# Patient Record
Sex: MALE
[De-identification: names, ages, dates, MRNs, and addresses within clinical notes are randomized per-mention and may not be internally consistent; named-entity substitution may affect disease eponyms.]

---

## 2022-10-21 PROBLEM — Z00.00 ENCOUNTER FOR PREVENTIVE HEALTH EXAMINATION: Status: ACTIVE | Noted: 2022-10-21

## 2022-10-24 ENCOUNTER — APPOINTMENT (OUTPATIENT)
Dept: UROLOGY | Facility: CLINIC | Age: 32
End: 2022-10-24

## 2023-08-01 PROBLEM — Z00.00 ENCOUNTER FOR PREVENTIVE HEALTH EXAMINATION: Status: ACTIVE | Noted: 2023-08-01

## 2023-08-03 PROBLEM — M25.561 RIGHT KNEE PAIN: Status: ACTIVE | Noted: 2023-08-03

## 2023-08-03 NOTE — PHYSICAL EXAM
[de-identified] : Constitutional:  [    ], alert and oriented, cooperative, in no acute distress.  HEENT  NC/AT.  Appearance: symmetric  Neck/Back Straight without deformity or instability.  Good ROM.  Chest/Respiratory  Respiratory effort: no intercostal retractions or use of accessory muscles. Nonlabored Breathing  Skin  On inspection, warm and dry without rashes or lesions.  Mental Status:  Judgment, insight: intact Orientation: oriented to time, place, and person  Neurological: Sensory and Motor are grossly intact throughout  Right Knee  Inspection:     Skin intact, no rashes or lesions     No Effusion     Non-tender to palpation over tibial tubercle, patella, medial and lateral joint line, and pes insertion.  Range of Motion: 	Extension - [     ] degrees 	Flexion - [     ] degrees 	Alignment - [     ] degrees 	Extensor lag: None  Stability:      Demonstrates no Varus or Valgus instability      Negative Anterior or Posterior drawer.      Negative Lachman's      Negative McMurrays  Patella: stable, tracks well.   Neurologic Exam     Motor intact including 5/5 Extensor Hallucis Longus, 5/5 Flexor Hallucis Longus, 5/5 Tibialis Anterior and 5/5 Gastrocnemius     Sensation Intact to Light Touch including Saphenous, Sural, Superficial Peroneal, Deep Peroneal, Tibial nerve distributions  Vascular Exam     Foot is warm and well perfused with 2+ Dorsalis Pedis Pulse   No pain with range of motion of the bilateral hips or left knee. No lumbar paraspinal muscle tenderness.

## 2023-08-04 ENCOUNTER — APPOINTMENT (OUTPATIENT)
Dept: ORTHOPEDIC SURGERY | Facility: CLINIC | Age: 33
End: 2023-08-04

## 2023-08-04 DIAGNOSIS — M25.561 PAIN IN RIGHT KNEE: ICD-10-CM

## 2023-08-08 PROBLEM — Z00.00 ENCOUNTER FOR PREVENTIVE HEALTH EXAMINATION: Status: ACTIVE | Noted: 2023-08-08

## 2023-08-11 ENCOUNTER — APPOINTMENT (OUTPATIENT)
Dept: ORTHOPEDIC SURGERY | Facility: CLINIC | Age: 33
End: 2023-08-11
Payer: COMMERCIAL

## 2023-08-11 VITALS
WEIGHT: 160 LBS | BODY MASS INDEX: 25.11 KG/M2 | DIASTOLIC BLOOD PRESSURE: 68 MMHG | HEART RATE: 67 BPM | HEIGHT: 67 IN | SYSTOLIC BLOOD PRESSURE: 104 MMHG

## 2023-08-11 DIAGNOSIS — M25.561 PAIN IN RIGHT KNEE: ICD-10-CM

## 2023-08-11 PROCEDURE — 73564 X-RAY EXAM KNEE 4 OR MORE: CPT | Mod: RT

## 2023-08-11 PROCEDURE — 72170 X-RAY EXAM OF PELVIS: CPT

## 2023-08-11 PROCEDURE — 99203 OFFICE O/P NEW LOW 30 MIN: CPT

## 2023-08-16 NOTE — PHYSICAL EXAM
[de-identified] : Constitutional: 33 year old male, alert and oriented, cooperative, in no acute distress.  HEENT  NC/AT.  Appearance: symmetric  Neck/Back Straight without deformity or instability.  Good ROM.  Chest/Respiratory  Respiratory effort: no intercostal retractions or use of accessory muscles. Nonlabored Breathing  Skin  On inspection, warm and dry without rashes or lesions.  Mental Status:  Judgment, insight: intact Orientation: oriented to time, place, and person  Neurological: Sensory and Motor are grossly intact throughout  Right Knee  Inspection:     Skin intact, no rashes or lesions     No Effusion     Non-tender to palpation over tibial tubercle, patella, medial and lateral joint line, and pes insertion.  Range of Motion: 	Extension - 0 degrees 	Flexion - 120 degrees 	Extensor lag: None  Stability:      Demonstrates no Varus or Valgus instability      Negative Anterior or Posterior drawer.      Negative Lachman's      Negative McMurrays  Patella: stable, tracks well.   Neurologic Exam     Motor intact including 5/5 Extensor Hallucis Longus, 5/5 Flexor Hallucis Longus, 5/5 Tibialis Anterior and 5/5 Gastrocnemius     Sensation Intact to Light Touch including Saphenous, Sural, Superficial Peroneal, Deep Peroneal, Tibial nerve distributions  Vascular Exam     Foot is warm and well perfused with 2+ Dorsalis Pedis Pulse   No pain with range of motion of the bilateral hips or left knee. No lumbar paraspinal muscle tenderness.  [de-identified] : XRay:  XRays of the Pelvis (1 View) taken in the office today and discussed with the patient. XRays demonstrate no obvious fracture or dislocation. There is no significant evidence of osteoarthritis or osteophyte formation. (my personal interpretation).   XRay:  XRays of the Right Knee (4 Views) taken in the office today and discussed with the patient. XRays demonstrate no obvious fracture or dislocation. There is no significant evidence of osteoarthritis or osteophyte formation. There is lateral patellar tracking seen on the [place] view. (my personal interpretation).

## 2023-08-16 NOTE — HISTORY OF PRESENT ILLNESS
[de-identified] : Theresa Bahena is a 33-year-old male who presents to the office for evaluation of his right knee pain.  Patient's been experiencing intermittent right knee pain for about 2 years.  He first had knee pain about 2 years ago and then it improved.  He had the pain again last year.  Pain is located over the anterior knee.  Pain is worse with squatting, bending, and flexion of the knee.  He is not taking any pain medications at this time.  He has not tried physical therapy or injections.  He has no pain with walking.  No hip pain.  Patient does have some left lower back pain.  No radiation of the pain.  History: None

## 2023-08-16 NOTE — DISCUSSION/SUMMARY
[de-identified] : Theresa Bahena is a 33-year-old male who presents to the office for evaluation of his right knee pain.  Patient has been experiencing intermittent right knee pain for about 2 years.  Pain is located over the anterior knee is worse with flexion of the knee.  X-rays showed no obvious fractures or dislocations, but did show lateral patellar tracking seen on the sunrise view.  Examination showed good knee range of motion.  Discussed with patient the examination and imaging findings.  Discussed with patient that his right knee pain is possibly secondary to patellofemoral syndrome.  Discussed the management of his knee pain at this time, including physical therapy, bracing, and anti-inflammatories.  Patient was given referral to physical therapy.  He does not want a knee brace at this time.  He will take over-the-counter anti-inflammatories as needed for his pain control.  Patient will follow-up in 2 to 3 months for reevaluation and management.  Patient understanding and in agreement with the plan.  All questions answered.  Plan: -Physical therapy -Follow-up in 2 to 3 months for reevaluation and management

## 2023-08-24 ENCOUNTER — APPOINTMENT (OUTPATIENT)
Dept: UROLOGY | Facility: CLINIC | Age: 33
End: 2023-08-24
Payer: COMMERCIAL

## 2023-08-24 VITALS
TEMPERATURE: 98.1 F | WEIGHT: 166 LBS | HEART RATE: 74 BPM | HEIGHT: 67 IN | RESPIRATION RATE: 16 BRPM | OXYGEN SATURATION: 96 % | SYSTOLIC BLOOD PRESSURE: 115 MMHG | BODY MASS INDEX: 26.06 KG/M2 | DIASTOLIC BLOOD PRESSURE: 74 MMHG

## 2023-08-24 DIAGNOSIS — R39.15 URGENCY OF URINATION: ICD-10-CM

## 2023-08-24 DIAGNOSIS — Z78.9 OTHER SPECIFIED HEALTH STATUS: ICD-10-CM

## 2023-08-24 DIAGNOSIS — R35.0 FREQUENCY OF MICTURITION: ICD-10-CM

## 2023-08-24 LAB
BILIRUB UR QL STRIP: NEGATIVE
GLUCOSE UR-MCNC: NEGATIVE
HCG UR QL: 0.2 EU/DL
HGB UR QL STRIP.AUTO: NEGATIVE
KETONES UR-MCNC: NEGATIVE
LEUKOCYTE ESTERASE UR QL STRIP: NEGATIVE
NITRITE UR QL STRIP: NEGATIVE
PH UR STRIP: 7
PROT UR STRIP-MCNC: NEGATIVE
SP GR UR STRIP: 1.02

## 2023-08-24 PROCEDURE — 81003 URINALYSIS AUTO W/O SCOPE: CPT | Mod: QW

## 2023-08-24 PROCEDURE — 51798 US URINE CAPACITY MEASURE: CPT

## 2023-08-24 PROCEDURE — 99203 OFFICE O/P NEW LOW 30 MIN: CPT

## 2023-08-24 RX ORDER — TROSPIUM CHLORIDE 60 MG/1
60 CAPSULE, EXTENDED RELEASE ORAL
Qty: 30 | Refills: 5 | Status: ACTIVE | COMMUNITY
Start: 2023-08-24 | End: 1900-01-01

## 2023-08-24 NOTE — HISTORY OF PRESENT ILLNESS
[Urinary Urgency] : urinary urgency [FreeTextEntry1] : 33M presents for initial evaluation of urinary frequency   PMH significant for: nothing PSH significant for: nothing Significant meds: nothing   Patient reports years-long history of urinary urgency Reports that is has been going on for about 4-5 years but has gotten progressively worse over the last 2 years Reports feeling urge to urinate 30 to 40 mins after drinking fluids  Reports moderate level of distress  Associated with nothing Previously treated with nothing  Daytime Frequency: 6-10 Nighttime Frequency: 0-1 Pads per day: 0 Straining to void: no Intermittency: no Dribbling: no Subjective Incomplete Emptying: no UTIs this past year: 0 PVR 36cc  Daily Fluid Total: 2L Daily Caffeine Total: 0   Neurologic Hx, Vision or Balance changes: no

## 2023-08-24 NOTE — ASSESSMENT
[FreeTextEntry1] : Mr. Bahena presents for initial evaluation of urinary urgency and frequency (new diagnosis, uncertain prognosis) Reports years long history of symptoms which have been progressing over time Normal fluid intake, caffeine intake, and behavioral habits No other clinically relevant history UA: WNL, PVR: 36  Discussed the risks, benefits, alternatives, and possible side effects of anticholinergic therapy with the patient, including but not limited to dry eyes, dry mouth, constipation and mental status changes. We reviewed literature suggesting increased risk of developing dementia with long-term use and that patient would need to be regularly monitored on the medication.  Recommendations -Start trospium 60 mg daily -RTC 1 month

## 2023-08-24 NOTE — REVIEW OF SYSTEMS
[Negative] : Heme/Lymph [FreeTextEntry2] : Strange urge to urinate causing leak of urine, Frequent Urination

## 2023-08-24 NOTE — PHYSICAL EXAM
[General Appearance - Well Developed] : well developed [General Appearance - Well Nourished] : well nourished [Normal Appearance] : normal appearance [Well Groomed] : well groomed [] : no respiratory distress [Abdomen Soft] : soft [Normal Station and Gait] : the gait and station were normal for the patient's age [Affect] : the affect was normal [Mood] : the mood was normal

## 2023-09-29 ENCOUNTER — APPOINTMENT (OUTPATIENT)
Dept: UROLOGY | Facility: CLINIC | Age: 33
End: 2023-09-29

## 2023-10-20 ENCOUNTER — APPOINTMENT (OUTPATIENT)
Dept: ORTHOPEDIC SURGERY | Facility: CLINIC | Age: 33
End: 2023-10-20

## 2023-12-15 ENCOUNTER — APPOINTMENT (OUTPATIENT)
Dept: ORTHOPEDIC SURGERY | Facility: CLINIC | Age: 33
End: 2023-12-15
Payer: COMMERCIAL

## 2023-12-15 VITALS
WEIGHT: 173 LBS | HEART RATE: 77 BPM | BODY MASS INDEX: 27.15 KG/M2 | HEIGHT: 67 IN | DIASTOLIC BLOOD PRESSURE: 61 MMHG | SYSTOLIC BLOOD PRESSURE: 100 MMHG

## 2023-12-15 DIAGNOSIS — M22.2X1 PATELLOFEMORAL DISORDERS, RIGHT KNEE: ICD-10-CM

## 2023-12-15 PROCEDURE — 99213 OFFICE O/P EST LOW 20 MIN: CPT

## 2023-12-15 NOTE — PHYSICAL EXAM
[de-identified] : Constitutional: 33 year old male, alert and oriented, cooperative, in no acute distress.  HEENT  NC/AT.  Appearance: symmetric  Neck/Back Straight without deformity or instability.  Good ROM.  Chest/Respiratory  Respiratory effort: no intercostal retractions or use of accessory muscles. Nonlabored Breathing  Skin  On inspection, warm and dry without rashes or lesions.  Mental Status:  Judgment, insight: intact Orientation: oriented to time, place, and person  Neurological: Sensory and Motor are grossly intact throughout  Right Knee  Inspection:     Skin intact, no rashes or lesions     No Effusion     Non-tender to palpation over tibial tubercle, patella, medial and lateral joint line, and pes insertion.  Range of Motion: 	Extension - 0 degrees 	Flexion - 120 degrees 	Extensor lag: None  Stability:      Demonstrates no Varus or Valgus instability      Negative Anterior or Posterior drawer.      Negative Lachman's      Negative McMurrays  Patella: stable, tracks well.   Neurologic Exam     Motor intact including 5/5 Extensor Hallucis Longus, 5/5 Flexor Hallucis Longus, 5/5 Tibialis Anterior and 5/5 Gastrocnemius     Sensation Intact to Light Touch including Saphenous, Sural, Superficial Peroneal, Deep Peroneal, Tibial nerve distributions  Vascular Exam     Foot is warm and well perfused with 2+ Dorsalis Pedis Pulse   No pain with range of motion of the bilateral hips or left knee. No lumbar paraspinal muscle tenderness.  [de-identified] : XRay:  XRays of the Pelvis (1 View) taken on 8/11/2023. XRays demonstrate no obvious fracture or dislocation. There is no significant evidence of osteoarthritis or osteophyte formation. (my personal interpretation).   XRay:  XRays of the Right Knee (4 Views) taken on 8/11/2023. XRays demonstrate no obvious fracture or dislocation. There is no significant evidence of osteoarthritis or osteophyte formation. There is lateral patellar tracking seen on the [place] view. (my personal interpretation).

## 2023-12-15 NOTE — HISTORY OF PRESENT ILLNESS
[de-identified] : 12/15/2023  Theresa Bahena presents to the office for follow-up of his right knee pain.  Patient continues to have right knee pain.  Pain is located over the anterior knee.  He did not go to formal physical therapy, but did home exercises.  Patient was running and felt a recurrence of the pain.  He then stopped exercises.  Pain is worse with running and jumping.  8/11/2023 Theresa Bahena is a 33-year-old male who presents to the office for evaluation of his right knee pain.  Patient's been experiencing intermittent right knee pain for about 2 years.  He first had knee pain about 2 years ago and then it improved.  He had the pain again last year.  Pain is located over the anterior knee.  Pain is worse with squatting, bending, and flexion of the knee.  He is not taking any pain medications at this time.  He has not tried physical therapy or injections.  He has no pain with walking.  No hip pain.  Patient does have some left lower back pain.  No radiation of the pain.  History: None

## 2023-12-15 NOTE — DISCUSSION/SUMMARY
[de-identified] : Theresa Bahena is a 33-year-old male who presents to the office for follow-up of his right knee pain.  Patient has been experiencing intermittent right knee pain for about 2 years.  Pain is located over the anterior knee is worse with flexion of the knee.  Prior x-rays showed no obvious fractures or dislocations, but did show lateral patellar tracking seen on the sunrise view.  Examination showed good knee range of motion.  Discussed with patient the examination and imaging findings.  Discussed with patient that his right knee pain is possibly secondary to patellofemoral syndrome.  Discussed the management of his knee pain at this time, including physical therapy and anti-inflammatories.  Patient was previously given a referral to physical therapy.  He will take over-the-counter anti-inflammatories as needed for his pain control.  Patient would like a right knee MRI.  Right knee MRI was ordered.  Patient will follow-up in 3 months for reevaluation and management, pending results of the MRI.  Patient understanding and in agreement with the plan.  All questions answered.  Plan: -MRI Right Knee -Physical therapy -Follow-up in 3 months for reevaluation and management

## 2024-08-09 ENCOUNTER — APPOINTMENT (OUTPATIENT)
Dept: ORTHOPEDIC SURGERY | Facility: CLINIC | Age: 34
End: 2024-08-09

## 2024-08-09 PROCEDURE — 99213 OFFICE O/P EST LOW 20 MIN: CPT

## 2024-08-09 NOTE — DISCUSSION/SUMMARY
[de-identified] : Theresa Bahena is a 33-year-old male who presents to the office for follow-up of his right knee pain.  Patient has been experiencing intermittent right knee pain for about 2 years.  Pain is located over the anterior knee is worse with flexion of the knee.  Prior x-rays showed no obvious fractures or dislocations, but did show lateral patellar tracking seen on the sunrise view.  Examination showed good knee range of motion.  Discussed with patient the examination and imaging findings.  Discussed with patient that his right knee pain is possibly secondary to patellofemoral syndrome.  Discussed the management of his knee pain at this time, including physical therapy and anti-inflammatories.  Patient was previously given a referral to physical therapy.  He will take over-the-counter anti-inflammatories as needed for his pain control.  Patient would like a right knee MRI.  Right knee MRI was ordered.  Patient will follow-up in 3 months for reevaluation and management, pending results of the MRI.  Patient understanding and in agreement with the plan.  All questions answered.  Plan: -MRI Right Knee -Home Exercises -Follow-up in 1 month for reevaluation and management

## 2024-08-09 NOTE — HISTORY OF PRESENT ILLNESS
[de-identified] : 8/9/2024  Continues to have right knee pain Tried PT, with some relief, but continues to have knee pain No falls No fevers/chills    12/15/2023  Theresa Bahena presents to the office for follow-up of his right knee pain.  Patient continues to have right knee pain.  Pain is located over the anterior knee.  He did not go to formal physical therapy, but did home exercises.  Patient was running and felt a recurrence of the pain.  He then stopped exercises.  Pain is worse with running and jumping.  8/11/2023 Theresa Bahena is a 33-year-old male who presents to the office for evaluation of his right knee pain.  Patient's been experiencing intermittent right knee pain for about 2 years.  He first had knee pain about 2 years ago and then it improved.  He had the pain again last year.  Pain is located over the anterior knee.  Pain is worse with squatting, bending, and flexion of the knee.  He is not taking any pain medications at this time.  He has not tried physical therapy or injections.  He has no pain with walking.  No hip pain.  Patient does have some left lower back pain.  No radiation of the pain.  History: None

## 2024-08-09 NOTE — PHYSICAL EXAM
[de-identified] : Constitutional: 34 year old male, alert and oriented, cooperative, in no acute distress.  HEENT  NC/AT.  Appearance: symmetric  Neck/Back Straight without deformity or instability.  Good ROM.  Chest/Respiratory  Respiratory effort: no intercostal retractions or use of accessory muscles. Nonlabored Breathing  Skin  On inspection, warm and dry without rashes or lesions.  Mental Status:  Judgment, insight: intact Orientation: oriented to time, place, and person  Neurological: Sensory and Motor are grossly intact throughout  Right Knee  Inspection:     Skin intact, no rashes or lesions     No Effusion     Non-tender to palpation over tibial tubercle, patella, medial and lateral joint line, and pes insertion.  Range of Motion: 	Extension - 0 degrees 	Flexion - 120 degrees 	Extensor lag: None  Stability:      Demonstrates no Varus or Valgus instability      Negative Anterior or Posterior drawer.      Negative Lachman's      Negative McMurrays  Patella: stable, tracks well.   Neurologic Exam     Motor intact including 5/5 Extensor Hallucis Longus, 5/5 Flexor Hallucis Longus, 5/5 Tibialis Anterior and 5/5 Gastrocnemius     Sensation Intact to Light Touch including Saphenous, Sural, Superficial Peroneal, Deep Peroneal, Tibial nerve distributions  Vascular Exam     Foot is warm and well perfused with 2+ Dorsalis Pedis Pulse   No pain with range of motion of the bilateral hips or left knee. No lumbar paraspinal muscle tenderness.  [de-identified] : XRay:  XRays of the Pelvis (1 View) taken on 8/11/2023. XRays demonstrate no obvious fracture or dislocation. There is no significant evidence of osteoarthritis or osteophyte formation. (my personal interpretation).   XRay:  XRays of the Right Knee (4 Views) taken on 8/11/2023. XRays demonstrate no obvious fracture or dislocation. There is no significant evidence of osteoarthritis or osteophyte formation. There is lateral patellar tracking seen on the [place] view. (my personal interpretation).

## 2024-08-09 NOTE — PHYSICAL EXAM
[de-identified] : Constitutional: 34 year old male, alert and oriented, cooperative, in no acute distress.  HEENT  NC/AT.  Appearance: symmetric  Neck/Back Straight without deformity or instability.  Good ROM.  Chest/Respiratory  Respiratory effort: no intercostal retractions or use of accessory muscles. Nonlabored Breathing  Skin  On inspection, warm and dry without rashes or lesions.  Mental Status:  Judgment, insight: intact Orientation: oriented to time, place, and person  Neurological: Sensory and Motor are grossly intact throughout  Right Knee  Inspection:     Skin intact, no rashes or lesions     No Effusion     Non-tender to palpation over tibial tubercle, patella, medial and lateral joint line, and pes insertion.  Range of Motion: 	Extension - 0 degrees 	Flexion - 120 degrees 	Extensor lag: None  Stability:      Demonstrates no Varus or Valgus instability      Negative Anterior or Posterior drawer.      Negative Lachman's      Negative McMurrays  Patella: stable, tracks well.   Neurologic Exam     Motor intact including 5/5 Extensor Hallucis Longus, 5/5 Flexor Hallucis Longus, 5/5 Tibialis Anterior and 5/5 Gastrocnemius     Sensation Intact to Light Touch including Saphenous, Sural, Superficial Peroneal, Deep Peroneal, Tibial nerve distributions  Vascular Exam     Foot is warm and well perfused with 2+ Dorsalis Pedis Pulse   No pain with range of motion of the bilateral hips or left knee. No lumbar paraspinal muscle tenderness.  [de-identified] : XRay:  XRays of the Pelvis (1 View) taken on 8/11/2023. XRays demonstrate no obvious fracture or dislocation. There is no significant evidence of osteoarthritis or osteophyte formation. (my personal interpretation).   XRay:  XRays of the Right Knee (4 Views) taken on 8/11/2023. XRays demonstrate no obvious fracture or dislocation. There is no significant evidence of osteoarthritis or osteophyte formation. There is lateral patellar tracking seen on the [place] view. (my personal interpretation).

## 2024-08-09 NOTE — HISTORY OF PRESENT ILLNESS
[de-identified] : 8/9/2024  Continues to have right knee pain Tried PT, with some relief, but continues to have knee pain No falls No fevers/chills    12/15/2023  Theresa Bahena presents to the office for follow-up of his right knee pain.  Patient continues to have right knee pain.  Pain is located over the anterior knee.  He did not go to formal physical therapy, but did home exercises.  Patient was running and felt a recurrence of the pain.  He then stopped exercises.  Pain is worse with running and jumping.  8/11/2023 Theresa Bahena is a 33-year-old male who presents to the office for evaluation of his right knee pain.  Patient's been experiencing intermittent right knee pain for about 2 years.  He first had knee pain about 2 years ago and then it improved.  He had the pain again last year.  Pain is located over the anterior knee.  Pain is worse with squatting, bending, and flexion of the knee.  He is not taking any pain medications at this time.  He has not tried physical therapy or injections.  He has no pain with walking.  No hip pain.  Patient does have some left lower back pain.  No radiation of the pain.  History: None

## 2024-08-09 NOTE — DISCUSSION/SUMMARY
[de-identified] : Theresa Bahena is a 33-year-old male who presents to the office for follow-up of his right knee pain.  Patient has been experiencing intermittent right knee pain for about 2 years.  Pain is located over the anterior knee is worse with flexion of the knee.  Prior x-rays showed no obvious fractures or dislocations, but did show lateral patellar tracking seen on the sunrise view.  Examination showed good knee range of motion.  Discussed with patient the examination and imaging findings.  Discussed with patient that his right knee pain is possibly secondary to patellofemoral syndrome.  Discussed the management of his knee pain at this time, including physical therapy and anti-inflammatories.  Patient was previously given a referral to physical therapy.  He will take over-the-counter anti-inflammatories as needed for his pain control.  Patient would like a right knee MRI.  Right knee MRI was ordered.  Patient will follow-up in 3 months for reevaluation and management, pending results of the MRI.  Patient understanding and in agreement with the plan.  All questions answered.  Plan: -MRI Right Knee -Home Exercises -Follow-up in 1 month for reevaluation and management

## 2024-08-29 ENCOUNTER — OUTPATIENT (OUTPATIENT)
Dept: OUTPATIENT SERVICES | Facility: HOSPITAL | Age: 34
LOS: 1 days | End: 2024-08-29
Payer: COMMERCIAL

## 2024-08-29 ENCOUNTER — APPOINTMENT (OUTPATIENT)
Dept: MRI IMAGING | Facility: CLINIC | Age: 34
End: 2024-08-29
Payer: COMMERCIAL

## 2024-08-29 DIAGNOSIS — M22.2X1 PATELLOFEMORAL DISORDERS, RIGHT KNEE: ICD-10-CM

## 2024-08-29 PROCEDURE — 73721 MRI JNT OF LWR EXTRE W/O DYE: CPT

## 2024-08-29 PROCEDURE — 73721 MRI JNT OF LWR EXTRE W/O DYE: CPT | Mod: 26,RT

## 2024-09-05 ENCOUNTER — NON-APPOINTMENT (OUTPATIENT)
Age: 34
End: 2024-09-05

## 2024-11-15 ENCOUNTER — APPOINTMENT (OUTPATIENT)
Dept: ORTHOPEDIC SURGERY | Facility: CLINIC | Age: 34
End: 2024-11-15

## 2024-12-03 ENCOUNTER — APPOINTMENT (OUTPATIENT)
Dept: ORTHOPEDIC SURGERY | Facility: CLINIC | Age: 34
End: 2024-12-03
Payer: COMMERCIAL

## 2024-12-03 DIAGNOSIS — M22.2X1 PATELLOFEMORAL DISORDERS, RIGHT KNEE: ICD-10-CM

## 2024-12-03 PROCEDURE — 99213 OFFICE O/P EST LOW 20 MIN: CPT
